# Patient Record
Sex: FEMALE | Race: WHITE | Employment: OTHER | URBAN - METROPOLITAN AREA
[De-identification: names, ages, dates, MRNs, and addresses within clinical notes are randomized per-mention and may not be internally consistent; named-entity substitution may affect disease eponyms.]

---

## 2023-07-25 ENCOUNTER — OFFICE VISIT (OUTPATIENT)
Age: 58
End: 2023-07-25
Payer: COMMERCIAL

## 2023-07-25 VITALS — BODY MASS INDEX: 27.16 KG/M2 | OXYGEN SATURATION: 97 % | HEIGHT: 65 IN | WEIGHT: 163 LBS

## 2023-07-25 DIAGNOSIS — F41.9 ANXIETY DISORDER, UNSPECIFIED TYPE: ICD-10-CM

## 2023-07-25 DIAGNOSIS — Z76.89 ENCOUNTER TO ESTABLISH CARE: ICD-10-CM

## 2023-07-25 DIAGNOSIS — Q67.6 PECTUS EXCAVATUM: ICD-10-CM

## 2023-07-25 DIAGNOSIS — I37.0 NONRHEUMATIC PULMONARY VALVE STENOSIS: Primary | ICD-10-CM

## 2023-07-25 DIAGNOSIS — Q24.8 ABNORMAL POSITION OF HEART: ICD-10-CM

## 2023-07-25 DIAGNOSIS — F43.10 POST-TRAUMATIC STRESS: ICD-10-CM

## 2023-07-25 DIAGNOSIS — I34.1 MVP (MITRAL VALVE PROLAPSE): ICD-10-CM

## 2023-07-25 DIAGNOSIS — M79.7 FIBROMYALGIA: ICD-10-CM

## 2023-07-25 PROCEDURE — 99204 OFFICE O/P NEW MOD 45 MIN: CPT | Performed by: INTERNAL MEDICINE

## 2023-07-25 RX ORDER — PRASTERONE (DHEA) 50 MG
CAPSULE ORAL
COMMUNITY

## 2023-07-25 RX ORDER — LANOLIN ALCOHOL/MO/W.PET/CERES
3 CREAM (GRAM) TOPICAL DAILY
COMMUNITY

## 2023-07-25 RX ORDER — VIT C/B6/B5/MAGNESIUM/HERB 173 50-5-6-5MG
CAPSULE ORAL DAILY
COMMUNITY

## 2023-07-25 ASSESSMENT — PATIENT HEALTH QUESTIONNAIRE - PHQ9
SUM OF ALL RESPONSES TO PHQ QUESTIONS 1-9: 0
2. FEELING DOWN, DEPRESSED OR HOPELESS: 0
SUM OF ALL RESPONSES TO PHQ QUESTIONS 1-9: 0
1. LITTLE INTEREST OR PLEASURE IN DOING THINGS: 0
SUM OF ALL RESPONSES TO PHQ9 QUESTIONS 1 & 2: 0
SUM OF ALL RESPONSES TO PHQ QUESTIONS 1-9: 0
SUM OF ALL RESPONSES TO PHQ QUESTIONS 1-9: 0

## 2023-07-25 NOTE — PATIENT INSTRUCTIONS
1:  The number to schedule with Dr. Jackie De Anda is:          2:  For the outside records, please provide a copy or your last notes from:  --behavioral health  --cardiology  --neurology  --general practitioner/primary care  --pulmonology  --rheumatology    Also if you have any updated screenings (pap, mammogram colonoscopy) or vaccines you have for our records also. Also if you have copies of labs below, please provide a copy for our records:  --cholesterol testing  --A1c  --kidney/liver testing  --complete blood counts.

## 2023-07-25 NOTE — PROGRESS NOTES
RM 18      Chief Complaint   Patient presents with    Establish Care     Pt is here to establish care. Pt states there are no concerns. 1. Have you been to the ER, urgent care clinic since your last visit? Hospitalized since your last visit? No    2. Have you seen or consulted any other health care providers outside of the [address] [place] [address] Avenue since your last visit? Include any pap smears or colon screening. No        Vitals:    07/25/23 1607   SpO2: 97%       AVS  education, follow up, and recommendations provided and addressed with patient. no

## 2023-07-25 NOTE — PROGRESS NOTES
Rosy Cassidy (: 1965) is a 56 y.o. female, new patient, here for evaluation of the following chief complaint(s):  Chief Complaint   Patient presents with    Establish Care     Pt is here to establish care. Pt states there are no concerns. Assessment and Plan:      Diagnosis Orders   1. Nonrheumatic pulmonary valve stenosis  [facility] Tree Ramírez MD, CardiologyOmer ([address] [place])      2. Pectus excavatum  [facility] Tree MD Darrell, CardiologyOmer ([facility])      3. Abnormal position of heart  [facility] Tree MD Darrell, CardiologyOmer ([facility] [facility])      4. MVP (mitral valve prolapse)  Jimmy Sherman MD, CardiologyOmer ([facility] [facility])      5. Fibromyalgia  External Referral To Rheumatology      6. Encounter to establish care        7. Anxiety disorder, unspecified type  [facility]      8. Post-traumatic stress  [facility]          1-4:  Referral(s) and referral coordination reviewed with patient at visit.    5:  External/[facility] provider/referral reviewed. Referral(s) and referral coordination reviewed with patient at visit. 7-8:  Referral(s) and referral coordination reviewed with patient at visit. Prefers eval there for mgt. Continuing therapy with provider she was seeing virtually prior to moving here. Return in about 3 months (around 10/25/2023) for referral follow-up--3-4mo. lab results and schedule of future lab studies reviewed with patient  reviewed medications and side effects in detail    For additional documentation of information and/or recommendations discussed this visit, please see notes in instructions. Plan and evaluation (above) reviewed with pt at visit  Patient voiced understanding of plan and provided with time to ask/review questions.   After Visit Summary (AVS) provided to pt after visit with additional instructions

## 2023-11-03 ENCOUNTER — OFFICE VISIT (OUTPATIENT)
Age: 58
End: 2023-11-03
Payer: COMMERCIAL

## 2023-11-03 VITALS
RESPIRATION RATE: 22 BRPM | OXYGEN SATURATION: 97 % | HEART RATE: 84 BPM | HEIGHT: 65 IN | BODY MASS INDEX: 27.55 KG/M2 | TEMPERATURE: 98.4 F

## 2023-11-03 DIAGNOSIS — Z88.0 PENICILLIN ALLERGY: ICD-10-CM

## 2023-11-03 DIAGNOSIS — Z88.1: ICD-10-CM

## 2023-11-03 DIAGNOSIS — J01.00 ACUTE NON-RECURRENT MAXILLARY SINUSITIS: Primary | ICD-10-CM

## 2023-11-03 PROCEDURE — 99214 OFFICE O/P EST MOD 30 MIN: CPT | Performed by: INTERNAL MEDICINE

## 2023-11-03 RX ORDER — CLINDAMYCIN HYDROCHLORIDE 300 MG/1
300 CAPSULE ORAL 3 TIMES DAILY
Qty: 21 CAPSULE | Refills: 0 | Status: SHIPPED | OUTPATIENT
Start: 2023-11-03 | End: 2023-11-10

## 2023-11-03 RX ORDER — AZITHROMYCIN 250 MG/1
250 TABLET, FILM COATED ORAL SEE ADMIN INSTRUCTIONS
Qty: 6 TABLET | Refills: 0 | Status: SHIPPED | OUTPATIENT
Start: 2023-11-03 | End: 2023-11-08

## 2023-11-03 SDOH — ECONOMIC STABILITY: FOOD INSECURITY: WITHIN THE PAST 12 MONTHS, THE FOOD YOU BOUGHT JUST DIDN'T LAST AND YOU DIDN'T HAVE MONEY TO GET MORE.: NEVER TRUE

## 2023-11-03 SDOH — ECONOMIC STABILITY: INCOME INSECURITY: HOW HARD IS IT FOR YOU TO PAY FOR THE VERY BASICS LIKE FOOD, HOUSING, MEDICAL CARE, AND HEATING?: NOT HARD AT ALL

## 2023-11-03 SDOH — ECONOMIC STABILITY: HOUSING INSECURITY
IN THE LAST 12 MONTHS, WAS THERE A TIME WHEN YOU DID NOT HAVE A STEADY PLACE TO SLEEP OR SLEPT IN A SHELTER (INCLUDING NOW)?: NO

## 2023-11-03 SDOH — ECONOMIC STABILITY: FOOD INSECURITY: WITHIN THE PAST 12 MONTHS, YOU WORRIED THAT YOUR FOOD WOULD RUN OUT BEFORE YOU GOT MONEY TO BUY MORE.: NEVER TRUE

## 2023-11-03 ASSESSMENT — PATIENT HEALTH QUESTIONNAIRE - PHQ9
1. LITTLE INTEREST OR PLEASURE IN DOING THINGS: 0
SUM OF ALL RESPONSES TO PHQ9 QUESTIONS 1 & 2: 0
SUM OF ALL RESPONSES TO PHQ QUESTIONS 1-9: 0
2. FEELING DOWN, DEPRESSED OR HOPELESS: 0
SUM OF ALL RESPONSES TO PHQ QUESTIONS 1-9: 0

## 2023-11-03 NOTE — PROGRESS NOTES
Rm: 20  Covid test was Negative   For 2 days been having a bloody nose     Chief Complaint   Patient presents with    Sinus Problem        1. Have you been to the ER, urgent care clinic since your last visit? Hospitalized since your last visit?no    2. Have you seen or consulted any other health care providers outside of the [address] [place] [address] since your last visit? Include any pap smears or colon screening. no        Social Determinants of Health     Tobacco Use: Low Risk  (11/3/2023)    Patient History     Smoking Tobacco Use: Never     Smokeless Tobacco Use: Never     Passive Exposure: Not on file   Alcohol Use: Not on file   Financial Resource Strain: Low Risk  (11/3/2023)    Overall Financial Resource Strain (CARDIA)     Difficulty of Paying Living Expenses: Not hard at all   Food Insecurity: No Food Insecurity (11/3/2023)    Hunger Vital Sign     Worried About Running Out of Food in the Last Year: Never true     Ran Out of Food in the Last Year: Never true   Transportation Needs: Unknown (11/3/2023)    PRAPARE - Transportation     Lack of Transportation (Medical): Not on file     Lack of Transportation (Non-Medical):  No   Physical Activity: Not on file   Stress: Not on file   Social Connections: Not on file   Intimate Partner Violence: Not on file   Depression: Not at risk (11/3/2023)    PHQ-2     PHQ-2 Score: 0   Housing Stability: Unknown (11/3/2023)    Housing Stability Vital Sign     Unable to Pay for Housing in the Last Year: Not on file     Number of Places Lived in the Last Year: Not on file     Unstable Housing in the Last Year: No   Interpersonal Safety: Not on file   Utilities: Not on file

## 2023-11-03 NOTE — PROGRESS NOTES
Melani Becker (: 1965) is a 66 y.o. female, established patient, here for evaluation of the following chief complaint(s):  Chief Complaint   Patient presents with    Sinus Problem       Assessment and Plan:      Diagnosis Orders   1. Acute non-recurrent maxillary sinusitis  clindamycin (CLEOCIN) 300 MG capsule    azithromycin (ZITHROMAX) 250 MG tablet      2. Penicillin allergy        3. Allergy to tetracycline            1-3:  Clinda planned for 5-7 days as reviewed. Azithromycin printed as alternative if unable to tolerate clinda (seen on Friday). Requested Prescriptions     Signed Prescriptions Disp Refills    clindamycin (CLEOCIN) 300 MG capsule 21 capsule 0     Sig: Take 1 capsule by mouth 3 times daily for 7 days    azithromycin (ZITHROMAX) 250 MG tablet 6 tablet 0     Sig: Take 1 tablet by mouth See Admin Instructions for 5 days 500mg PO on day 1 followed by 250mg on days 2-5. Take/fill if unable to tolerate Clindamycin. Return in about 3 months (around 2/3/2024), or if symptoms worsen or fail to improve, for referral follow-up--3-4mo. lab results and schedule of future lab studies reviewed with patient  reviewed diet, exercise and weight control  reviewed medications and side effects in detail    Plan and evaluation (above) reviewed with pt at visit  Patient voiced understanding of plan and provided with time to ask/review questions. After Visit Summary (AVS) provided to pt after visit with additional instructions as needed/reviewed. Future Appointments   Date Time Provider [address] [place] [address]   2024 10:30 AM Renee Guidry MD CPNATHAN BS AMB   --Updated future visits after patient check-out. On this date 2023 I have spent 32 minutes reviewing previous notes, test results and face to face with the patient discussing the diagnosis and importance of compliance with the treatment plan as well as documenting on the day of the visit.       History of Present Illness:

## 2024-01-25 ENCOUNTER — TELEPHONE (OUTPATIENT)
Age: 59
End: 2024-01-25

## 2024-01-25 NOTE — TELEPHONE ENCOUNTER
Patient called requesting a new patient appointment. Patient stated she called several times leaving voice mails without a response. Patient informed the referral , but explained at the time the referral was placed we were not accepting new patients.      Sent an Epic message to Dr. Middleton to send a new referral. Will call patient to do intake screening at that time.

## 2024-01-26 ENCOUNTER — TELEPHONE (OUTPATIENT)
Age: 59
End: 2024-01-26

## 2024-01-26 DIAGNOSIS — F41.9 ANXIETY: Primary | ICD-10-CM

## 2024-01-26 NOTE — TELEPHONE ENCOUNTER
Patient is requesting a new referral for anxiety. Patient states she has tried calling to get a appointment and was unsuccessful. Please call patient when new referral is placed.

## 2024-01-30 ENCOUNTER — TELEPHONE (OUTPATIENT)
Age: 59
End: 2024-01-30

## 2024-01-30 ENCOUNTER — OFFICE VISIT (OUTPATIENT)
Age: 59
End: 2024-01-30
Payer: MEDICAID

## 2024-01-30 VITALS
HEIGHT: 65 IN | OXYGEN SATURATION: 99 % | HEART RATE: 88 BPM | DIASTOLIC BLOOD PRESSURE: 76 MMHG | SYSTOLIC BLOOD PRESSURE: 127 MMHG | BODY MASS INDEX: 27.55 KG/M2

## 2024-01-30 DIAGNOSIS — Q26.8 PULMONARY VEIN STENOSIS: Primary | ICD-10-CM

## 2024-01-30 DIAGNOSIS — I34.0 NONRHEUMATIC MITRAL VALVE REGURGITATION: ICD-10-CM

## 2024-01-30 DIAGNOSIS — Z87.898 HISTORY OF SYNCOPE: ICD-10-CM

## 2024-01-30 PROCEDURE — 99244 OFF/OP CNSLTJ NEW/EST MOD 40: CPT | Performed by: SPECIALIST

## 2024-01-30 ASSESSMENT — PATIENT HEALTH QUESTIONNAIRE - PHQ9
2. FEELING DOWN, DEPRESSED OR HOPELESS: 0
SUM OF ALL RESPONSES TO PHQ QUESTIONS 1-9: 0
SUM OF ALL RESPONSES TO PHQ QUESTIONS 1-9: 0
SUM OF ALL RESPONSES TO PHQ9 QUESTIONS 1 & 2: 0
SUM OF ALL RESPONSES TO PHQ QUESTIONS 1-9: 0
SUM OF ALL RESPONSES TO PHQ QUESTIONS 1-9: 0
1. LITTLE INTEREST OR PLEASURE IN DOING THINGS: 0

## 2024-01-30 NOTE — PROGRESS NOTES
x-ray which is probably already been done would be helpful to see if she had a scimitar sign or evidence of plethora in the pulmonary vasculature.  The other issues are possible syncope but they have been during times of emotional stress and I suspect are noncardiac or vascular.  She does not want much workup and refused meds anyway.  There are some difficulties in her assessment but overall we gave her reassurance and for this highly anxious patient hope to work with her and establish a good relationship.  Thanks.           Impression:   1. Pulmonary vein stenosis    2. History of syncope    3. Nonrheumatic mitral valve regurgitation        Cardiac History:   Cardiac history-patient seen at age 18 was exertional dyspnea at [facility] with syncope at the age of 32.  No specific diagnosis.  She had a cardiac CT in 2013 with no evidence of CAD and there was some question about compression of the left inferior pulmonary vein.  An event recorder at that time showed sinus rhythm with PVCs less than 1% time and echo in April 2022 was considered normal except for mild left atrial enlargement.  She saw correlation was referred to [place] [address] to Dr. Regino Finch.  She had a prolonged hospitalization for COVID she was previously seen at [facility] cardiology practice in [place] at [facility].  Records are from Dr. Rick Stahl on the date of visit is 5/31/2022 and, 4/19/2022, and 2/16/2021, and 1/7/2021  Echo 1/15/2021 EF 5055% RV size normal RA size normal.  Aortic mean gradient was 3 there is normal pulmonic valve and no peripheral effusion.  Echo 10/27/2015 no mitral prolapse there is mild MR EF 5055% possible inferoseptal mild hypokinesia mild TR.     Future Appointments   Date Time Provider Department Center   2/6/2024 10:30 AM Kofi Middleton MD [facility]IM BS AMB   2/29/2024 11:00 AM AUDRA AREVALO ECHO 2 BONY WALLER AMB   3/4/2025 11:40 AM Azalia Dey MD [facility] BS AMB      Patient

## 2024-01-30 NOTE — TELEPHONE ENCOUNTER
Attempted to contact patient to inform that this office has not received a revised referral. Patients phone rang with no vm picking up.

## 2024-01-30 NOTE — PATIENT INSTRUCTIONS
You have been scheduled for an echo.    We will send results via [employer]. We will see you back for an annual video visit.

## 2024-01-31 ENCOUNTER — TELEPHONE (OUTPATIENT)
Age: 59
End: 2024-01-31

## 2024-01-31 NOTE — TELEPHONE ENCOUNTER
Spoke with Kamilla who is Dr. Middleton [facility].  Informed her the referral was automatically closed. In order to continue with the intake screening for Ms. Fred, Dr. Middleton will need to put in a new referral. Informed Kamilla that this nurse also send a My Chart message to Dr. Middleton regarding the referral. Kamilla stated she will talk with the doctor in between patients.     Called patient and informed of the the above information.

## 2024-02-02 NOTE — TELEPHONE ENCOUNTER
She can see [facility] provider--referral placed, or call her insurance for covered providers.    Diagnoses and all orders for this call:    Anxiety  -     [facility] - Nidhi Bella, [place], Behavioral Health, [place]      There is a new provider there--Carlos--but she is not in our database correctly.

## 2024-02-06 ENCOUNTER — OFFICE VISIT (OUTPATIENT)
Age: 59
End: 2024-02-06
Payer: MEDICAID

## 2024-02-06 VITALS — HEART RATE: 74 BPM | OXYGEN SATURATION: 96 % | RESPIRATION RATE: 22 BRPM | BODY MASS INDEX: 27.55 KG/M2 | HEIGHT: 65 IN

## 2024-02-06 DIAGNOSIS — M79.7 FIBROMYALGIA: ICD-10-CM

## 2024-02-06 DIAGNOSIS — F80.81 STUTTERING: Primary | ICD-10-CM

## 2024-02-06 PROCEDURE — 99214 OFFICE O/P EST MOD 30 MIN: CPT | Performed by: INTERNAL MEDICINE

## 2024-02-06 NOTE — PROGRESS NOTES
Rm: 18  Pt refused BP, Temp and wt   Chief Complaint   Patient presents with    Follow-up     3 month follow         1. Have you been to the ER, urgent care clinic since your last visit?  Hospitalized since your last visit?no    2. Have you seen or consulted any other health care providers outside of the [facility] since your last visit?  Include any pap smears or colon screening. no        Social Determinants of Health     Tobacco Use: Medium Risk (2/6/2024)    Patient History     Smoking Tobacco Use: Former     Smokeless Tobacco Use: Never     Passive Exposure: Not on file   Alcohol Use: Not on file   Financial Resource Strain: Low Risk  (2/6/2024)    Overall Financial Resource Strain (CARDIA)     Difficulty of Paying Living Expenses: Not hard at all   Food Insecurity: No Food Insecurity (2/6/2024)    Hunger Vital Sign     Worried About Running Out of Food in the Last Year: Never true     Ran Out of Food in the Last Year: Never true   Transportation Needs: Unknown (2/6/2024)    PRAPARE - Transportation     Lack of Transportation (Medical): Not on file     Lack of Transportation (Non-Medical): No   Physical Activity: Not on file   Stress: Not on file   Social Connections: Not on file   Intimate Partner Violence: Not on file   Depression: At risk (2/6/2024)    PHQ-2     PHQ-2 Score: 3   Housing Stability: Unknown (2/6/2024)    Housing Stability Vital Sign     Unable to Pay for Housing in the Last Year: Not on file     Number of Places Lived in the Last Year: Not on file     Unstable Housing in the Last Year: No   Interpersonal Safety: Not on file   Utilities: Not on file

## 2024-02-06 NOTE — PATIENT INSTRUCTIONS
To schedule with Rheumatology Dr. Zimmerman:    Phone: [phone]    Or:    You can schedule with [facility] at either   --[phone] ([facility] location) or   --[phone] ([facility] location).

## 2024-02-06 NOTE — PROGRESS NOTES
Em Ibarra (: 1965) is a 58 y.o. female, established patient, here for evaluation of the following chief complaint(s):  Chief Complaint   Patient presents with    Follow-up     3 month follow      Note:  Very anxious waiting in exam room without window--added electronic Post-it in EMR to remind to room in room with window.    Assessment and Plan:      Diagnosis Orders   1. Stuttering  Dayne Puri MD, Neurology, [place]      2. Fibromyalgia  Dayne Puri MD, Neurology, [place]    External Referral To Rheumatology          1-2:  Referral(s) and referral coordination reviewed with patient at visit.      Return in about 6 months (around 2024), or if symptoms worsen or fail to improve, for referral follow-up.  lab results and schedule of future lab studies reviewed with patient  reviewed medications and side effects in detail--prefers to avoid meds as able.    For additional documentation of information and/or recommendations discussed this visit, please see notes in instructions.    Plan and evaluation (above) reviewed with pt at visit  Patient voiced understanding of plan and provided with time to ask/review questions.  After Visit Summary (AVS) provided to pt after visit with additional instructions as needed/reviewed.      Future Appointments   Date Time Provider Department Center   2024 10:00 AM [facility]   2024 11:00 AM BSC AREVALO ECHO 2 YARITZAREY BS AMB   2024 10:30 AM Kofi Middleton MD [facility] BS AMB   3/4/2025 11:40 AM Azalia Dey MD [facility] BS AMB   --Updated future visits after patient check-out.  --She had planned to do echo in 6mo, but scheduled for Feb as above.      History of Present Illness:     Notes (nursing/rooming note copied below in italics):  As above and in nursing note.    Pt refused BP, Temp and wt      Seen in interim on 24 by Dr. Dey.  He has echo scheduled with office follow-up

## 2024-02-14 ENCOUNTER — TELEMEDICINE (OUTPATIENT)
Facility: CLINIC | Age: 59
End: 2024-02-14

## 2024-02-14 DIAGNOSIS — F41.9 ANXIETY: Primary | ICD-10-CM

## 2024-02-14 NOTE — PROGRESS NOTES
History of Present Illness: Em Ibarra is a 58 y.o. female who presents with symptoms of anxiety, stress, and agitation    Duration of session: 55 min    Mental Status exam:         Sensorium  oriented to time, place and person   Relations cooperative   Appearance:  within normal Limits   Motor Behavior:  restless   Speech:  articulation error   Thought Process: goal directed and tangential   Thought Content no hallucinations and no delusions   Suicidal ideations none   Homicidal ideations none   Mood:  anxious and labile   Affect:  increased in intensity and labile   Memory recent  adequate   Memory remote:  adequate   Concentration:  impaired   Abstraction:  abstract   Insight:  good   Reliability wnl   Judgment:  good         DIAGNOSIS AND IMPRESSION:      Axis I: Anxiety Disorder NOS  Axis II: Deferred  Axis III:   Past Medical History:   Diagnosis Date    ADHD (attention deficit hyperactivity disorder)     Anxiety     Asthma As a child    Cerebrovascular disease     Mini stroke    Chronic back pain     Congenital heart disease     Burth defect    Fibromyalgia     GERD (gastroesophageal reflux disease)     Headache     Stuttering      Axis IV: Problems with primary support group, Economic problems, and Other psychosocial or environmental problems  Axis V:  51-60 moderate symptoms      Strengths: danny, personable, humor, motivated, smart  Trauma: emancipated age 18, parents , she was \"ping pong ball\" with them; covid almost , small stroke    Client presents via mychart for initial vv with this provider, presents as anxious, with service dog, intense, forthcoming, hyperverbal, she and 13 year old daughter, Aravind, moved from [place] last memorial day.  Now on perm. Disability, had covid and \"almost \" in  as world was opening back up, 9 mos on oxygen tank in her home, was isolated in tiny room in hospital for 6 weeks,  from daughter who was 11 at the time, hardest part.  Much of her

## 2024-02-22 ENCOUNTER — TELEMEDICINE (OUTPATIENT)
Facility: CLINIC | Age: 59
End: 2024-02-22

## 2024-02-22 DIAGNOSIS — F41.9 ANXIETY: Primary | ICD-10-CM

## 2024-02-22 NOTE — PROGRESS NOTES
History of Present Illness: Em Ibarra is a 58 y.o. female who presents with symptoms of anxiety, stress, and agitation     Duration of session: 64 min     Mental Status exam:           Sensorium  oriented to time, place and person   Relations cooperative   Appearance:  within normal Limits   Motor Behavior:  restless   Speech:  articulation error   Thought Process: goal directed and tangential   Thought Content no hallucinations and no delusions   Suicidal ideations none   Homicidal ideations none   Mood:  anxious and labile   Affect:  increased in intensity and labile   Memory recent  adequate   Memory remote:  adequate   Concentration:  impaired   Abstraction:  abstract   Insight:  good   Reliability wnl   Judgment:  good            DIAGNOSIS AND IMPRESSION:        Axis I: Anxiety Disorder NOS  Axis II: Deferred  Axis III:   Past Medical History        Past Medical History:   Diagnosis Date    ADHD (attention deficit hyperactivity disorder)      Anxiety      Asthma As a child    Cerebrovascular disease       Mini stroke    Chronic back pain      Congenital heart disease       Burth defect    Fibromyalgia      GERD (gastroesophageal reflux disease)      Headache      Stuttering           Axis IV: Problems with primary support group, Economic problems, and Other psychosocial or environmental problems  Axis V:  51-60 moderate symptoms        Strengths: danny, personable, humor, motivated, smart  Trauma: emancipated age 18, parents , she was \"ping pong ball\" with them; covid almost , small stroke     Client presents via mychart for 2nd vv follow up, anxious but much more calm and comfortable than last session.  Talkative, forthcoming, reports headache all day.  Has daughter's pinning ceremony next week that she will go to, sit in front and have everyone looking at her, expressed anxiety about this but resolve to go.  Follow up next month.      Em Ibarra, was evaluated through a synchronous

## 2024-03-20 ENCOUNTER — TELEMEDICINE (OUTPATIENT)
Facility: CLINIC | Age: 59
End: 2024-03-20
Payer: MEDICAID

## 2024-03-20 DIAGNOSIS — F41.9 ANXIETY: Primary | ICD-10-CM

## 2024-03-20 PROCEDURE — 90837 PSYTX W PT 60 MINUTES: CPT | Performed by: SOCIAL WORKER

## 2024-03-20 NOTE — PROGRESS NOTES
History of Present Illness: Em Ibarra is a 58 y.o. female who presents with symptoms of anxiety, stress, and agitation     Duration of session: 57 min     Mental Status exam:           Sensorium  oriented to time, place and person   Relations cooperative   Appearance:  within normal Limits   Motor Behavior:  restless   Speech:  articulation error   Thought Process: goal directed and tangential   Thought Content no hallucinations and no delusions   Suicidal ideations none   Homicidal ideations none   Mood:  anxious and labile   Affect:  wnl   Memory recent  adequate   Memory remote:  adequate   Concentration:  impaired   Abstraction:  abstract   Insight:  good   Reliability wnl   Judgment:  good            DIAGNOSIS AND IMPRESSION:        Axis I: Anxiety Disorder NOS  Axis II: Deferred  Axis III:   Past Medical History           Past Medical History:   Diagnosis Date    ADHD (attention deficit hyperactivity disorder)      Anxiety      Asthma As a child    Cerebrovascular disease       Mini stroke    Chronic back pain      Congenital heart disease       Burth defect    Fibromyalgia      GERD (gastroesophageal reflux disease)      Headache      Stuttering           Axis IV: Problems with primary support group, Economic problems, and Other psychosocial or environmental problems  Axis V:  51-60 moderate symptoms        Strengths: danny, personable, humor, motivated, smart  Trauma: emancipated age 18, parents , she was \"ping pong ball\" with them; covid almost , small stroke     Client presents via mychart for 3rd vv, stable, mood improved, seems more comfortable with this provider.  Reports daughter's award ceremony went well.  Has been cooking a lot more lately, therapeutic.  Good socialization, 93 year old aunt having a birthday this weekend, will get together.  Follow up next month.      Em Ibarra, was evaluated through a synchronous (real-time) audio-video encounter. The patient (or guardian

## 2024-04-17 ENCOUNTER — TELEMEDICINE (OUTPATIENT)
Facility: CLINIC | Age: 59
End: 2024-04-17
Payer: MEDICAID

## 2024-04-17 DIAGNOSIS — F41.9 ANXIETY: Primary | ICD-10-CM

## 2024-04-17 PROCEDURE — 90837 PSYTX W PT 60 MINUTES: CPT | Performed by: SOCIAL WORKER

## 2024-04-17 NOTE — PROGRESS NOTES
Em Ibarra, was evaluated through a synchronous (real-time) audio-video encounter. The patient (or guardian if applicable) is aware that this is a billable service, which includes applicable co-pays. This Virtual Visit was conducted with patient's (and/or legal guardian's) consent. Patient identification was verified, and a caregiver was present when appropriate.   The patient was located at Home: [facility] [address] - [facility] [address]  Provider was located at Home (Appt Dept State[place]  Confirm you are appropriately licensed, registered, or certified to deliver care in the state where the patient is located as indicated above. If you are not or unsure, please re-schedule the visit: Yes, I confirm.     Em Ibarra (:  1965) is a Established patient, presenting virtually for evaluation of the following:    Assessment & Plan   Below is the assessment and plan developed based on review of pertinent history, physical exam, labs, studies, and medications.  1. Anxiety    Return in about 4 weeks (around 5/15/2024).           --Tana Cruz LCSW

## 2024-05-15 ENCOUNTER — TELEMEDICINE (OUTPATIENT)
Facility: CLINIC | Age: 59
End: 2024-05-15
Payer: MEDICAID

## 2024-05-15 DIAGNOSIS — F41.9 ANXIETY: Primary | ICD-10-CM

## 2024-05-15 PROCEDURE — 90837 PSYTX W PT 60 MINUTES: CPT | Performed by: SOCIAL WORKER

## 2024-05-15 NOTE — PROGRESS NOTES
History of Present Illness: Em Ibarra is a 58 y.o. female who presents with symptoms of anxiety, stress, and agitation     Duration of session: 57 min     Mental Status exam:           Sensorium  oriented to time, place and person   Relations cooperative   Appearance:  within normal Limits   Motor Behavior:  restless   Speech:  articulation error   Thought Process: goal directed and tangential   Thought Content no hallucinations and no delusions   Suicidal ideations none   Homicidal ideations none   Mood:  anxious and labile   Affect:  wnl   Memory recent  adequate   Memory remote:  adequate   Concentration:  impaired   Abstraction:  abstract   Insight:  good   Reliability wnl   Judgment:  good            DIAGNOSIS AND IMPRESSION:        Axis I: Anxiety Disorder NOS  Axis II: Deferred  Axis III:   Past Medical History           Past Medical History:   Diagnosis Date    ADHD (attention deficit hyperactivity disorder)      Anxiety      Asthma As a child    Cerebrovascular disease       Mini stroke    Chronic back pain      Congenital heart disease       Burth defect    Fibromyalgia      GERD (gastroesophageal reflux disease)      Headache      Stuttering           Axis IV: Problems with primary support group, Economic problems, and Other psychosocial or environmental problems  Axis V:  51-60 moderate symptoms        Strengths: danny, personable, humor, motivated, smart  Trauma: emancipated age 18, parents , she was \"ping pong ball\" with them; covid almost , small stroke     Client presents via [facility] for follow up vv, good space, stable.  Processed in depth her trip to [place] and the interaction with friend, Sergio, who was daughter's .  Sergio displayed inappropriate behaviors throughout the trip, surprising cl and her daughter.   Follow up next month.             Em Ibarra, was evaluated through a synchronous (real-time) audio-video encounter. The patient (or guardian if

## 2024-06-04 ENCOUNTER — ANCILLARY PROCEDURE (OUTPATIENT)
Age: 59
End: 2024-06-04
Payer: MEDICAID

## 2024-06-04 VITALS
SYSTOLIC BLOOD PRESSURE: 127 MMHG | BODY MASS INDEX: 27.16 KG/M2 | HEIGHT: 65 IN | WEIGHT: 163 LBS | DIASTOLIC BLOOD PRESSURE: 76 MMHG

## 2024-06-04 DIAGNOSIS — I34.0 NONRHEUMATIC MITRAL VALVE REGURGITATION: ICD-10-CM

## 2024-06-04 DIAGNOSIS — I28.8: ICD-10-CM

## 2024-06-04 DIAGNOSIS — Z87.898 HISTORY OF SYNCOPE: ICD-10-CM

## 2024-06-04 PROCEDURE — 93306 TTE W/DOPPLER COMPLETE: CPT | Performed by: SPECIALIST

## 2024-06-07 LAB
ECHO AO ROOT DIAM: 3.5 CM
ECHO AO ROOT INDEX: 1.94 CM/M2
ECHO AV AREA PEAK VELOCITY: 2.4 CM2
ECHO AV AREA VTI: 2.5 CM2
ECHO AV AREA/BSA PEAK VELOCITY: 1.3 CM2/M2
ECHO AV AREA/BSA VTI: 1.4 CM2/M2
ECHO AV MEAN GRADIENT: 5 MMHG
ECHO AV MEAN VELOCITY: 1 M/S
ECHO AV PEAK GRADIENT: 8 MMHG
ECHO AV PEAK VELOCITY: 1.4 M/S
ECHO AV VELOCITY RATIO: 0.57
ECHO AV VTI: 32.6 CM
ECHO BSA: 1.83 M2
ECHO EST RA PRESSURE: 3 MMHG
ECHO LA DIAMETER INDEX: 1.67 CM/M2
ECHO LA DIAMETER: 3 CM
ECHO LA TO AORTIC ROOT RATIO: 0.86
ECHO LA VOL A-L A2C: 67 ML (ref 22–52)
ECHO LA VOL A-L A4C: 37 ML (ref 22–52)
ECHO LA VOL BP: 50 ML (ref 22–52)
ECHO LA VOL MOD A2C: 65 ML (ref 22–52)
ECHO LA VOL MOD A4C: 37 ML (ref 22–52)
ECHO LA VOL/BSA BIPLANE: 28 ML/M2 (ref 16–34)
ECHO LA VOLUME AREA LENGTH: 51 ML
ECHO LA VOLUME INDEX A-L A2C: 37 ML/M2 (ref 16–34)
ECHO LA VOLUME INDEX A-L A4C: 21 ML/M2 (ref 16–34)
ECHO LA VOLUME INDEX AREA LENGTH: 28 ML/M2 (ref 16–34)
ECHO LA VOLUME INDEX MOD A2C: 36 ML/M2 (ref 16–34)
ECHO LA VOLUME INDEX MOD A4C: 21 ML/M2 (ref 16–34)
ECHO LV E' LATERAL VELOCITY: 8 CM/S
ECHO LV E' SEPTAL VELOCITY: 8 CM/S
ECHO LV EDV A2C: 84 ML
ECHO LV EDV A4C: 76 ML
ECHO LV EDV BP: 87 ML (ref 56–104)
ECHO LV EDV INDEX A4C: 42 ML/M2
ECHO LV EDV INDEX BP: 48 ML/M2
ECHO LV EDV NDEX A2C: 47 ML/M2
ECHO LV EF PHYSICIAN: 50 %
ECHO LV EJECTION FRACTION A2C: 49 %
ECHO LV EJECTION FRACTION A4C: 35 %
ECHO LV ESV A2C: 43 ML
ECHO LV ESV A4C: 50 ML
ECHO LV ESV BP: 48 ML (ref 19–49)
ECHO LV ESV INDEX A2C: 24 ML/M2
ECHO LV ESV INDEX A4C: 28 ML/M2
ECHO LV ESV INDEX BP: 27 ML/M2
ECHO LV FRACTIONAL SHORTENING: 22 % (ref 28–44)
ECHO LV INTERNAL DIMENSION DIASTOLE INDEX: 2.72 CM/M2
ECHO LV INTERNAL DIMENSION DIASTOLIC: 4.9 CM (ref 3.9–5.3)
ECHO LV INTERNAL DIMENSION SYSTOLIC INDEX: 2.11 CM/M2
ECHO LV INTERNAL DIMENSION SYSTOLIC: 3.8 CM
ECHO LV IVSD: 0.8 CM (ref 0.6–0.9)
ECHO LV MASS 2D: 131.2 G (ref 67–162)
ECHO LV MASS INDEX 2D: 72.9 G/M2 (ref 43–95)
ECHO LV POSTERIOR WALL DIASTOLIC: 0.8 CM (ref 0.6–0.9)
ECHO LV RELATIVE WALL THICKNESS RATIO: 0.33
ECHO LVOT AREA: 4.2 CM2
ECHO LVOT AV VTI INDEX: 0.61
ECHO LVOT DIAM: 2.3 CM
ECHO LVOT MEAN GRADIENT: 1 MMHG
ECHO LVOT PEAK GRADIENT: 3 MMHG
ECHO LVOT PEAK VELOCITY: 0.8 M/S
ECHO LVOT STROKE VOLUME INDEX: 46.1 ML/M2
ECHO LVOT SV: 83.1 ML
ECHO LVOT VTI: 20 CM
ECHO MV A VELOCITY: 0.41 M/S
ECHO MV E DECELERATION TIME (DT): 217.2 MS
ECHO MV E VELOCITY: 0.64 M/S
ECHO MV E/A RATIO: 1.56
ECHO MV E/E' LATERAL: 8
ECHO MV E/E' RATIO (AVERAGED): 8
ECHO MV E/E' SEPTAL: 8
ECHO RIGHT VENTRICULAR SYSTOLIC PRESSURE (RVSP): 20 MMHG
ECHO RV BASAL DIMENSION: 3 CM
ECHO RV FREE WALL PEAK S': 6 CM/S
ECHO RV TAPSE: 1.3 CM (ref 1.7–?)
ECHO TV REGURGITANT MAX VELOCITY: 2.06 M/S
ECHO TV REGURGITANT PEAK GRADIENT: 17 MMHG

## 2024-06-19 ENCOUNTER — TELEMEDICINE (OUTPATIENT)
Facility: CLINIC | Age: 59
End: 2024-06-19

## 2024-06-19 DIAGNOSIS — F41.9 ANXIETY: Primary | ICD-10-CM

## 2024-06-19 PROCEDURE — 90837 PSYTX W PT 60 MINUTES: CPT | Performed by: SOCIAL WORKER

## 2024-06-19 NOTE — PROGRESS NOTES
History of Present Illness: Em Ibarra is a 58 y.o. female who presents with symptoms of anxiety, stress, and agitation     Duration of session: 56 min     Mental Status exam:           Sensorium  oriented to time, place and person   Relations cooperative   Appearance:  within normal Limits   Motor Behavior:  restless   Speech:  articulation error   Thought Process: goal directed and tangential   Thought Content no hallucinations and no delusions   Suicidal ideations none   Homicidal ideations none   Mood:  anxious and labile   Affect:  wnl   Memory recent  adequate   Memory remote:  adequate   Concentration:  impaired   Abstraction:  abstract   Insight:  good   Reliability wnl   Judgment:  good            DIAGNOSIS AND IMPRESSION:        Axis I: Anxiety Disorder NOS  Axis II: Deferred  Axis III:   Past Medical History           Past Medical History:   Diagnosis Date    ADHD (attention deficit hyperactivity disorder)      Anxiety      Asthma As a child    Cerebrovascular disease       Mini stroke    Chronic back pain      Congenital heart disease       Burth defect    Fibromyalgia      GERD (gastroesophageal reflux disease)      Headache      Stuttering           Axis IV: Problems with primary support group, Economic problems, and Other psychosocial or environmental problems  Axis V:  51-60 moderate symptoms        Strengths: danny, personable, humor, motivated, smart  Trauma: emancipated age 18, parents , she was \"ping pong ball\" with them; covid almost , small stroke     Client presents via mychart for follow up vv, good space, stable, with some stuttering.  Reports anxiety of late as daughter is away at camp in [place] until .  Fidgeting, wringing hands together.  Encourage creative activities, allowing for different ways of doing things.  Follow up next month.          Em Ibarra, was evaluated through a synchronous (real-time) audio-video encounter. The patient (or guardian if

## 2024-07-22 ENCOUNTER — TELEMEDICINE (OUTPATIENT)
Facility: CLINIC | Age: 59
End: 2024-07-22
Payer: MEDICARE

## 2024-07-22 DIAGNOSIS — F90.2 ATTENTION DEFICIT HYPERACTIVITY DISORDER (ADHD), COMBINED TYPE: ICD-10-CM

## 2024-07-22 DIAGNOSIS — F41.9 ANXIETY: Primary | ICD-10-CM

## 2024-07-22 PROBLEM — F90.9 ADHD (ATTENTION DEFICIT HYPERACTIVITY DISORDER): Status: ACTIVE | Noted: 2024-07-22

## 2024-07-22 PROCEDURE — 90837 PSYTX W PT 60 MINUTES: CPT | Performed by: SOCIAL WORKER

## 2024-07-22 NOTE — PROGRESS NOTES
History of Present Illness: Em Ibarra is a 58 y.o. female who presents with symptoms of anxiety, stress, and agitation     Duration of session: 60 min     Mental Status exam:           Sensorium  oriented to time, place and person   Relations cooperative   Appearance:  within normal Limits   Motor Behavior:  restless   Speech:  Wnl, much improved, no stuttering   Thought Process: goal directed and tangential   Thought Content no hallucinations and no delusions   Suicidal ideations none   Homicidal ideations none   Mood:  anxious and labile   Affect:  wnl   Memory recent  adequate   Memory remote:  adequate   Concentration:  impaired   Abstraction:  abstract   Insight:  good   Reliability wnl   Judgment:  good            DIAGNOSIS AND IMPRESSION:        Axis I: Anxiety Disorder NOS, adhd  Axis II: Deferred  Axis III:   Past Medical History           Past Medical History:   Diagnosis Date    ADHD (attention deficit hyperactivity disorder)      Anxiety      Asthma As a child    Cerebrovascular disease       Mini stroke    Chronic back pain      Congenital heart disease       Burth defect    Fibromyalgia      GERD (gastroesophageal reflux disease)      Headache      Stuttering           Axis IV: Problems with primary support group, Economic problems, and Other psychosocial or environmental problems  Axis V:  51-60 moderate symptoms        Strengths: danny, personable, humor, motivated, smart  Trauma: emancipated age 18, parents , she was \"ping pong ball\" with them; covid almost , small stroke     Client presents via mychart for follow up vv, good space, stable, no stuttering,  much improved.  Reports process of anxiety and overstimulation with daughter's schedule with camps/trips, youth groups.  Cl expressed dissatisfaction with supervision at the overnight camp.  Follow up next month.          Em Ibarra, was evaluated through a synchronous (real-time) audio-video encounter. The patient (or

## 2024-08-20 ENCOUNTER — TELEMEDICINE (OUTPATIENT)
Facility: CLINIC | Age: 59
End: 2024-08-20
Payer: MEDICARE

## 2024-08-20 DIAGNOSIS — F41.9 ANXIETY: Primary | ICD-10-CM

## 2024-08-20 DIAGNOSIS — F90.2 ATTENTION DEFICIT HYPERACTIVITY DISORDER (ADHD), COMBINED TYPE: ICD-10-CM

## 2024-08-20 PROCEDURE — 90834 PSYTX W PT 45 MINUTES: CPT | Performed by: SOCIAL WORKER

## 2024-08-20 NOTE — PROGRESS NOTES
encounter. The patient (or guardian if applicable) is aware that this is a billable service, which includes applicable co-pays. This Virtual Visit was conducted with patient's (and/or legal guardian's) consent. Patient identification was verified, and a caregiver was present when appropriate.   The patient was located at Home: [address] [place] [address] [address] [facility] [address]  Provider was located at Home ([place]t Dept State[place]  Confirm you are appropriately licensed, registered, or certified to deliver care in the state where the patient is located as indicated above. If you are not or unsure, please re-schedule the visit: Yes, I confirm.     Em Ibarra (:  1965) is a Established patient, presenting virtually for evaluation of the following:      Below is the assessment and plan developed based on review of pertinent history, physical exam, labs, studies, and medications.     Assessment & Plan  Anxiety            Attention deficit hyperactivity disorder (ADHD), combined type              Return in about 4 weeks (around 2024).             --Tana Cruz LCSW

## 2024-08-22 ENCOUNTER — TELEPHONE (OUTPATIENT)
Age: 59
End: 2024-08-22

## 2024-08-22 NOTE — TELEPHONE ENCOUNTER
Patient is requesting a call to see if she can r/s her appt for something sooner than November. Pt has IBS and is experiencing diarrhea and is not able to make today's appt.     Please advise.

## 2024-09-17 ENCOUNTER — TELEMEDICINE (OUTPATIENT)
Facility: CLINIC | Age: 59
End: 2024-09-17
Payer: MEDICARE

## 2024-09-17 DIAGNOSIS — F90.2 ATTENTION DEFICIT HYPERACTIVITY DISORDER (ADHD), COMBINED TYPE: ICD-10-CM

## 2024-09-17 DIAGNOSIS — F41.9 ANXIETY: Primary | ICD-10-CM

## 2024-09-17 PROCEDURE — 90834 PSYTX W PT 45 MINUTES: CPT | Performed by: SOCIAL WORKER

## 2024-10-15 ENCOUNTER — TELEMEDICINE (OUTPATIENT)
Facility: CLINIC | Age: 59
End: 2024-10-15
Payer: MEDICARE

## 2024-10-15 DIAGNOSIS — F41.9 ANXIETY: Primary | ICD-10-CM

## 2024-10-15 DIAGNOSIS — F90.2 ATTENTION DEFICIT HYPERACTIVITY DISORDER (ADHD), COMBINED TYPE: ICD-10-CM

## 2024-10-15 PROCEDURE — 90837 PSYTX W PT 60 MINUTES: CPT | Performed by: SOCIAL WORKER

## 2024-10-15 ASSESSMENT — PATIENT HEALTH QUESTIONNAIRE - PHQ9: DEPRESSION UNABLE TO ASSESS: PT REFUSES

## 2024-10-15 NOTE — PROGRESS NOTES
History of Present Illness: Em Ibarra is a 59 y.o. female who presents with symptoms of anxiety, stress, and agitation     Duration of session: 57 min     Mental Status exam:           Sensorium  oriented to time, place and person   Relations cooperative   Appearance:  within normal Limits   Motor Behavior:  restless   Speech:  Wnl, much improved, no stuttering   Thought Process: goal directed and tangential   Thought Content no hallucinations and no delusions   Suicidal ideations none   Homicidal ideations none   Mood:  wnl   Affect:  wnl   Memory recent  adequate   Memory remote:  adequate   Concentration:  impaired   Abstraction:  abstract   Insight:  good   Reliability wnl   Judgment:  good            DIAGNOSIS AND IMPRESSION:        Axis I: Anxiety Disorder NOS, adhd  Axis II: Deferred  Axis III:   Past Medical History           Past Medical History:   Diagnosis Date    ADHD (attention deficit hyperactivity disorder)      Anxiety      Asthma As a child    Cerebrovascular disease       Mini stroke    Chronic back pain      Congenital heart disease       Burth defect    Fibromyalgia      GERD (gastroesophageal reflux disease)      Headache      Stuttering           Axis IV: Problems with primary support group, Economic problems, and Other psychosocial or environmental problems  Axis V:  51-60 moderate symptoms        Strengths: danny, personable, humor, motivated, smart  Trauma: emancipated age 18, parents , she was \"ping pong ball\" with them; covid almost , small stroke     Client presents via mychart for follow up vv, good space, stable, no stuttering,  much improved.   Processed feelings of irritation and concern about daughter not being allowed to attend and participate in the all male fencing team.  Is on the team but as they are traveling overnight this is presenting some complications.  Cl being assertive, appropriately so.   Follow up next month.                   Em Ibarra

## 2024-11-12 ENCOUNTER — TELEMEDICINE (OUTPATIENT)
Facility: CLINIC | Age: 59
End: 2024-11-12

## 2024-11-12 DIAGNOSIS — F90.2 ATTENTION DEFICIT HYPERACTIVITY DISORDER (ADHD), COMBINED TYPE: ICD-10-CM

## 2024-11-12 DIAGNOSIS — F41.9 ANXIETY: Primary | ICD-10-CM

## 2024-11-12 ASSESSMENT — PATIENT HEALTH QUESTIONNAIRE - PHQ9: DEPRESSION UNABLE TO ASSESS: PT REFUSES

## 2024-11-12 NOTE — PROGRESS NOTES
History of Present Illness: mE Ibarra is a 59 y.o. female who presents with symptoms of anxiety, stress, and agitation     Duration of session: 54 min     Mental Status exam:           Sensorium  oriented to time, place and person   Relations cooperative   Appearance:  within normal Limits   Motor Behavior:  restless   Speech:  Wnl, much improved, no stuttering   Thought Process: goal directed and tangential   Thought Content no hallucinations and no delusions   Suicidal ideations none   Homicidal ideations none   Mood:  wnl   Affect:  wnl   Memory recent  adequate   Memory remote:  adequate   Concentration:  impaired   Abstraction:  abstract   Insight:  good   Reliability wnl   Judgment:  good            DIAGNOSIS AND IMPRESSION:        Axis I: Anxiety Disorder NOS, adhd  Axis II: Deferred  Axis III:   Past Medical History           Past Medical History:   Diagnosis Date    ADHD (attention deficit hyperactivity disorder)      Anxiety      Asthma As a child    Cerebrovascular disease       Mini stroke    Chronic back pain      Congenital heart disease       Burth defect    Fibromyalgia      GERD (gastroesophageal reflux disease)      Headache      Stuttering           Axis IV: Problems with primary support group, Economic problems, and Other psychosocial or environmental problems  Axis V:  51-60 moderate symptoms        Strengths: danny, personable, humor, motivated, smart  Trauma: emancipated age 18, parents , she was \"ping pong ball\" with them; covid almost , small stroke     Client presents via mychart for follow up vv, fair space, feeling anxiety as she has during the holiday season as this was around the time she initially got sick with covid.  Has made friends with daughter's bf's mom.  Excited about this.  Follow up next month.               Em Ibarra, was evaluated through a synchronous (real-time) audio-video encounter. The patient (or guardian if applicable) is aware that this

## 2024-12-10 ENCOUNTER — TELEMEDICINE (OUTPATIENT)
Facility: CLINIC | Age: 59
End: 2024-12-10
Payer: MEDICARE

## 2024-12-10 DIAGNOSIS — F41.9 ANXIETY: Primary | ICD-10-CM

## 2024-12-10 DIAGNOSIS — F90.2 ATTENTION DEFICIT HYPERACTIVITY DISORDER (ADHD), COMBINED TYPE: ICD-10-CM

## 2024-12-10 PROCEDURE — 90837 PSYTX W PT 60 MINUTES: CPT | Performed by: SOCIAL WORKER

## 2024-12-10 ASSESSMENT — PATIENT HEALTH QUESTIONNAIRE - PHQ9: DEPRESSION UNABLE TO ASSESS: PT REFUSES

## 2024-12-10 NOTE — PROGRESS NOTES
History of Present Illness: Em Ibarra is a 59 y.o. female who presents with symptoms of anxiety, stress, and agitation     Duration of session: 53 min     Mental Status exam:           Sensorium  oriented to time, place and person   Relations cooperative   Appearance:  within normal Limits   Motor Behavior:  restless   Speech:  Wnl, much improved, no stuttering   Thought Process: goal directed and tangential   Thought Content no hallucinations and no delusions   Suicidal ideations none   Homicidal ideations none   Mood:  wnl   Affect:  wnl   Memory recent  adequate   Memory remote:  adequate   Concentration:  impaired   Abstraction:  abstract   Insight:  good   Reliability wnl   Judgment:  good            DIAGNOSIS AND IMPRESSION:        Axis I: Anxiety Disorder NOS, adhd  Axis II: Deferred  Axis III:   Past Medical History           Past Medical History:   Diagnosis Date    ADHD (attention deficit hyperactivity disorder)      Anxiety      Asthma As a child    Cerebrovascular disease       Mini stroke    Chronic back pain      Congenital heart disease       Burth defect    Fibromyalgia      GERD (gastroesophageal reflux disease)      Headache      Stuttering           Axis IV: Problems with primary support group, Economic problems, and Other psychosocial or environmental problems  Axis V:  51-60 moderate symptoms        Strengths: danny, personable, humor, motivated, smart  Trauma: emancipated age 18, parents , she was \"ping pong ball\" with them; covid almost , small stroke     Client presents via mychart for follow up vv, fair space, feeling anxiety as she  has fallen 3 times this past week.  Attributes this to stress and trying to do too much, standing up for too long at a time, etc.  Has been active for the holiday, decorating, etc.  Having new friend over tomorrow.  Follow up next month.          Em Ibarra, was evaluated through a synchronous (real-time) audio-video encounter. The

## 2025-01-16 ENCOUNTER — TELEMEDICINE (OUTPATIENT)
Facility: CLINIC | Age: 60
End: 2025-01-16
Payer: MEDICARE

## 2025-01-16 DIAGNOSIS — F90.2 ATTENTION DEFICIT HYPERACTIVITY DISORDER (ADHD), COMBINED TYPE: ICD-10-CM

## 2025-01-16 DIAGNOSIS — F80.81 STUTTERING: ICD-10-CM

## 2025-01-16 DIAGNOSIS — F41.9 ANXIETY: Primary | ICD-10-CM

## 2025-01-16 PROCEDURE — 90834 PSYTX W PT 45 MINUTES: CPT | Performed by: SOCIAL WORKER

## 2025-01-16 ASSESSMENT — PATIENT HEALTH QUESTIONNAIRE - PHQ9: DEPRESSION UNABLE TO ASSESS: PT REFUSES

## 2025-01-22 NOTE — PROGRESS NOTES
History of Present Illness: Em Ibarra is a 59 y.o. female who presents with symptoms of anxiety, stress, and agitation     Duration of session: 50 min     Mental Status exam:           Sensorium  oriented to time, place and person   Relations cooperative   Appearance:  within normal Limits   Motor Behavior:  restless   Speech:  Wnl, much improved, no stuttering   Thought Process: goal directed and tangential   Thought Content no hallucinations and no delusions   Suicidal ideations none   Homicidal ideations none   Mood:  wnl   Affect:  wnl   Memory recent  adequate   Memory remote:  adequate   Concentration:  impaired   Abstraction:  abstract   Insight:  good   Reliability wnl   Judgment:  good            DIAGNOSIS AND IMPRESSION:        Axis I: Anxiety Disorder NOS, adhd  Axis II: Deferred  Axis III:   Past Medical History           Past Medical History:   Diagnosis Date    ADHD (attention deficit hyperactivity disorder)      Anxiety      Asthma As a child    Cerebrovascular disease       Mini stroke    Chronic back pain      Congenital heart disease       Burth defect    Fibromyalgia      GERD (gastroesophageal reflux disease)      Headache      Stuttering           Axis IV: Problems with primary support group, Economic problems, and Other psychosocial or environmental problems  Axis V:  51-60 moderate symptoms        Strengths: danny, personable, humor, motivated, smart  Trauma: emancipated age 18, parents , she was \"ping pong ball\" with them; covid almost , small stroke     Client presents via mychart for follow up vv, fair space, feeling ill with fever and has no heat in extremely low temps.  Has service calls in, concerned about people in and out of the house, also several people a head of her that they need to fix.  Cl expressed concern, anxiety about this.  Daughter doing well.  Follow up next month.          Em Ibarra, was evaluated through a synchronous (real-time) audio-video

## 2025-02-13 ENCOUNTER — TELEMEDICINE (OUTPATIENT)
Facility: CLINIC | Age: 60
End: 2025-02-13

## 2025-02-13 DIAGNOSIS — F80.81 STUTTERING: ICD-10-CM

## 2025-02-13 DIAGNOSIS — F90.2 ATTENTION DEFICIT HYPERACTIVITY DISORDER (ADHD), COMBINED TYPE: ICD-10-CM

## 2025-02-13 DIAGNOSIS — F41.9 ANXIETY: Primary | ICD-10-CM

## 2025-02-14 ASSESSMENT — PATIENT HEALTH QUESTIONNAIRE - PHQ9: DEPRESSION UNABLE TO ASSESS: PT REFUSES

## 2025-02-15 NOTE — PROGRESS NOTES
applicable co-pays. This Virtual Visit was conducted with patient's (and/or legal guardian's) consent. Patient identification was verified, and a caregiver was present when appropriate.   [facility] [address] [facility] [address]  Provider was located at Home [place]  Confirm you are appropriately licensed, registered, or certified to deliver care in the state where the patient is located as indicated above. If you are not or unsure, please re-schedule the visit: Yes, I confirm.     Em Ibarra (:  1965) is a Established patient, presenting virtually for evaluation of the following:      Below is the assessment and plan developed based on review of pertinent history, physical exam, labs, studies, and medications.     Assessment & Plan  Anxiety            Attention deficit hyperactivity disorder (ADHD), combined type            Stuttering                  --Tana Cruz LCSW

## 2025-03-18 ENCOUNTER — TELEMEDICINE (OUTPATIENT)
Facility: CLINIC | Age: 60
End: 2025-03-18
Payer: MEDICARE

## 2025-03-18 DIAGNOSIS — F90.2 ATTENTION DEFICIT HYPERACTIVITY DISORDER (ADHD), COMBINED TYPE: ICD-10-CM

## 2025-03-18 DIAGNOSIS — F41.9 ANXIETY: Primary | ICD-10-CM

## 2025-03-18 PROCEDURE — 90837 PSYTX W PT 60 MINUTES: CPT | Performed by: SOCIAL WORKER

## 2025-03-18 ASSESSMENT — PATIENT HEALTH QUESTIONNAIRE - PHQ9: DEPRESSION UNABLE TO ASSESS: PT REFUSES

## 2025-03-18 NOTE — PROGRESS NOTES
aware that this is a billable service, which includes applicable co-pays. This Virtual Visit was conducted with patient's (and/or legal guardian's) consent. Patient identification was verified, and a caregiver was present when appropriate.   The patient was located at Home: [facility] [address] - [address] [facility] [address]  Provider was located at Home [place]  Confirm you are appropriately licensed, registered, or certified to deliver care in the state where the patient is located as indicated above. If you are not or unsure, please re-schedule the visit: Yes, I confirm.     Em Ibarra (:  1965) is a Established patient, presenting virtually for evaluation of the following:      Below is the assessment and plan developed based on review of pertinent history, physical exam, labs, studies, and medications.     Assessment & Plan  Anxiety            Attention deficit hyperactivity disorder (ADHD), combined type              Return in about 4 weeks (around 4/15/2025).         --Tana Cruz LCSW

## 2025-04-14 ENCOUNTER — TELEMEDICINE (OUTPATIENT)
Facility: CLINIC | Age: 60
End: 2025-04-14
Payer: MEDICARE

## 2025-04-14 DIAGNOSIS — F80.81 STUTTERING: ICD-10-CM

## 2025-04-14 DIAGNOSIS — F90.2 ATTENTION DEFICIT HYPERACTIVITY DISORDER (ADHD), COMBINED TYPE: ICD-10-CM

## 2025-04-14 DIAGNOSIS — F41.9 ANXIETY: Primary | ICD-10-CM

## 2025-04-14 PROCEDURE — 90837 PSYTX W PT 60 MINUTES: CPT | Performed by: SOCIAL WORKER

## 2025-04-14 ASSESSMENT — PATIENT HEALTH QUESTIONNAIRE - PHQ9: DEPRESSION UNABLE TO ASSESS: PT REFUSES

## 2025-04-14 NOTE — PROGRESS NOTES
History of Present Illness: Em Ibarra is a 59 y.o. female who presents with symptoms of anxiety, stress, and agitation     Duration of session: 55 min     Mental Status exam:           Sensorium  oriented to time, place and person   Relations cooperative   Appearance:   wnl   Motor Behavior:  restless   Speech:  Wnl, much improved, no stuttering   Thought Process: goal directed and tangential   Thought Content no hallucinations and no delusions   Suicidal ideations none   Homicidal ideations none   Mood:  wnl   Affect:  wnl   Memory recent  adequate   Memory remote:  adequate   Concentration:  impaired   Abstraction:  abstract   Insight:  good   Reliability wnl   Judgment:  good            DIAGNOSIS AND IMPRESSION:        Axis I: Anxiety Disorder NOS, adhd  Axis II: Deferred  Axis III:   Past Medical History           Past Medical History:   Diagnosis Date    ADHD (attention deficit hyperactivity disorder)      Anxiety      Asthma As a child    Cerebrovascular disease       Mini stroke    Chronic back pain      Congenital heart disease       Burth defect    Fibromyalgia      GERD (gastroesophageal reflux disease)      Headache      Stuttering           Axis IV: Problems with primary support group, Economic problems, and Other psychosocial or environmental problems  Axis V:  51-60 moderate symptoms        Strengths: danny, personable, humor, motivated, smart  Trauma: emancipated age 18, parents , she was \"ping pong ball\" with them; covid almost , small stroke     Client presents via mychart for vv follow up, anxious in presentation and reports.  Reports was sick again last week, it was a stressful, overstimulating week.  Went to [employer], very crowded.  Cl reports she had a panic attack.  Encouraged cl to try meditative practices, using positive words.      Follow up next month.             Em Ibarra, was evaluated through a synchronous (real-time) audio-video encounter. The patient (or

## 2025-05-12 ENCOUNTER — TELEMEDICINE (OUTPATIENT)
Facility: CLINIC | Age: 60
End: 2025-05-12
Payer: MEDICARE

## 2025-05-12 DIAGNOSIS — F90.2 ATTENTION DEFICIT HYPERACTIVITY DISORDER (ADHD), COMBINED TYPE: ICD-10-CM

## 2025-05-12 DIAGNOSIS — F41.9 ANXIETY: Primary | ICD-10-CM

## 2025-05-12 DIAGNOSIS — F80.81 STUTTERING: ICD-10-CM

## 2025-05-12 PROCEDURE — 90837 PSYTX W PT 60 MINUTES: CPT | Performed by: SOCIAL WORKER

## 2025-05-12 ASSESSMENT — PATIENT HEALTH QUESTIONNAIRE - PHQ9: DEPRESSION UNABLE TO ASSESS: PT REFUSES

## 2025-05-12 NOTE — PROGRESS NOTES
History of Present Illness: Em Ibarra is a 59 y.o. female who presents with symptoms of anxiety, stress, and agitation     Duration of session: 58 min     Mental Status exam:           Sensorium  oriented to time, place and person   Relations cooperative   Appearance:   wnl   Motor Behavior:  restless   Speech:  Wnl, much improved, no stuttering   Thought Process: goal directed and tangential   Thought Content no hallucinations and no delusions   Suicidal ideations none   Homicidal ideations none   Mood:  wnl   Affect:  wnl   Memory recent  adequate   Memory remote:  adequate   Concentration:  impaired   Abstraction:  abstract   Insight:  good   Reliability wnl   Judgment:  good            DIAGNOSIS AND IMPRESSION:        Axis I: Anxiety Disorder NOS, adhd  Axis II: Deferred  Axis III:   Past Medical History           Past Medical History:   Diagnosis Date    ADHD (attention deficit hyperactivity disorder)      Anxiety      Asthma As a child    Cerebrovascular disease       Mini stroke    Chronic back pain      Congenital heart disease       Burth defect    Fibromyalgia      GERD (gastroesophageal reflux disease)      Headache      Stuttering           Axis IV: Problems with primary support group, Economic problems, and Other psychosocial or environmental problems  Axis V:  51-60 moderate symptoms        Strengths: danny, personable, humor, motivated, smart  Trauma: emancipated age 18, parents , she was \"ping pong ball\" with them; covid almost , small stroke     Client presents via mychart for vv follow up, calmer than last session.  Daughter has concussion, okay.  Discussed cl's experiences fostering babies years ago.  Related this to Mother's day yesterday.      Follow up next month.                Em Ibarra, was evaluated through a synchronous (real-time) audio-video encounter. The patient (or guardian if applicable) is aware that this is a billable service, which includes applicable

## 2025-06-24 ENCOUNTER — TELEMEDICINE (OUTPATIENT)
Facility: CLINIC | Age: 60
End: 2025-06-24
Payer: MEDICARE

## 2025-06-24 DIAGNOSIS — F41.9 ANXIETY: Primary | ICD-10-CM

## 2025-06-24 DIAGNOSIS — F90.2 ATTENTION DEFICIT HYPERACTIVITY DISORDER (ADHD), COMBINED TYPE: ICD-10-CM

## 2025-06-24 DIAGNOSIS — F80.81 STUTTERING: ICD-10-CM

## 2025-06-24 PROCEDURE — 90837 PSYTX W PT 60 MINUTES: CPT | Performed by: SOCIAL WORKER

## 2025-06-24 ASSESSMENT — PATIENT HEALTH QUESTIONNAIRE - PHQ9: DEPRESSION UNABLE TO ASSESS: PT REFUSES

## 2025-06-24 NOTE — PROGRESS NOTES
evaluated through a synchronous (real-time) audio-video encounter. The patient (or guardian if applicable) is aware that this is a billable service, which includes applicable co-pays. This Virtual Visit was conducted with patient's (and/or legal guardian's) consent. Patient identification was verified, and a caregiver was present when appropriate.   The patient was located at Home: [address] [place] [address]  - [address] [facility] [address]  Provider was located at Home [place]  Confirm you are appropriately licensed, registered, or certified to deliver care in the state where the patient is located as indicated above. If you are not or unsure, please re-schedule the visit: Yes, I confirm.     Em Ibarra (:  1965) is a Established patient, presenting virtually for evaluation of the following:      Below is the assessment and plan developed based on review of pertinent history, physical exam, labs, studies, and medications.     Assessment & Plan  Anxiety            Attention deficit hyperactivity disorder (ADHD), combined type            Stuttering              No follow-ups on file.           --Tana Cruz LCSW

## 2025-07-23 ENCOUNTER — TELEMEDICINE (OUTPATIENT)
Facility: CLINIC | Age: 60
End: 2025-07-23

## 2025-07-23 DIAGNOSIS — F80.81 STUTTERING: ICD-10-CM

## 2025-07-23 DIAGNOSIS — F90.2 ATTENTION DEFICIT HYPERACTIVITY DISORDER (ADHD), COMBINED TYPE: ICD-10-CM

## 2025-07-23 DIAGNOSIS — F41.9 ANXIETY: Primary | ICD-10-CM

## 2025-07-23 ASSESSMENT — PATIENT HEALTH QUESTIONNAIRE - PHQ9: DEPRESSION UNABLE TO ASSESS: PT REFUSES

## 2025-07-23 NOTE — PROGRESS NOTES
History of Present Illness: Em Ibarra is a 59 y.o. female who presents with symptoms of anxiety, stress, and agitation     Duration of session: 52 min     Mental Status exam:           Sensorium  oriented to time, place and person   Relations cooperative   Appearance:   wnl   Motor Behavior:  restless   Speech:  Wnl, much improved, no stuttering   Thought Process: goal directed and tangential   Thought Content no hallucinations and no delusions   Suicidal ideations none   Homicidal ideations none   Mood:  wnl   Affect:  wnl   Memory recent  adequate   Memory remote:  adequate   Concentration:  impaired   Abstraction:  abstract   Insight:  good   Reliability wnl   Judgment:  good            DIAGNOSIS AND IMPRESSION:        Axis I: Anxiety Disorder NOS, adhd  Axis II: Deferred  Axis III:   Past Medical History           Past Medical History:   Diagnosis Date    ADHD (attention deficit hyperactivity disorder)      Anxiety      Asthma As a child    Cerebrovascular disease       Mini stroke    Chronic back pain      Congenital heart disease       Burth defect    Fibromyalgia      GERD (gastroesophageal reflux disease)      Headache      Stuttering           Axis IV: Problems with primary support group, Economic problems, and Other psychosocial or environmental problems  Axis V:  51-60 moderate symptoms        Strengths: danny, personable, humor, motivated, smart  Trauma: emancipated age 18, parents , she was \"ping pong ball\" with them; covid almost , small stroke     Client presents via mychart for vv follow up, mood improved, no stuttering, calm.  Cl in upbeat mood as daughter returned from trip before she will leave again soon for next one.  Cl acknowledges she has been feeling better, staying active, feeling better physically.      Follow up next month.                Em Ibarra, was evaluated through a synchronous (real-time) audio-video encounter. The patient (or guardian if applicable) is

## 2025-08-26 ENCOUNTER — TELEMEDICINE (OUTPATIENT)
Facility: CLINIC | Age: 60
End: 2025-08-26
Payer: MEDICARE

## 2025-08-26 DIAGNOSIS — F90.2 ATTENTION DEFICIT HYPERACTIVITY DISORDER (ADHD), COMBINED TYPE: ICD-10-CM

## 2025-08-26 DIAGNOSIS — F41.9 ANXIETY: Primary | ICD-10-CM

## 2025-08-26 DIAGNOSIS — F80.81 STUTTERING: ICD-10-CM

## 2025-08-26 PROCEDURE — 90837 PSYTX W PT 60 MINUTES: CPT | Performed by: SOCIAL WORKER

## 2025-08-26 ASSESSMENT — PATIENT HEALTH QUESTIONNAIRE - PHQ9: DEPRESSION UNABLE TO ASSESS: PT REFUSES
